# Patient Record
Sex: FEMALE | Race: WHITE | NOT HISPANIC OR LATINO | URBAN - METROPOLITAN AREA
[De-identification: names, ages, dates, MRNs, and addresses within clinical notes are randomized per-mention and may not be internally consistent; named-entity substitution may affect disease eponyms.]

---

## 2017-01-29 ENCOUNTER — OUTPATIENT (OUTPATIENT)
Dept: OUTPATIENT SERVICES | Facility: HOSPITAL | Age: 47
LOS: 1 days | Discharge: HOME | End: 2017-01-29

## 2017-06-27 DIAGNOSIS — R42 DIZZINESS AND GIDDINESS: ICD-10-CM

## 2018-02-11 ENCOUNTER — OUTPATIENT (OUTPATIENT)
Dept: OUTPATIENT SERVICES | Facility: HOSPITAL | Age: 48
LOS: 1 days | Discharge: HOME | End: 2018-02-11

## 2018-02-11 DIAGNOSIS — R22.0 LOCALIZED SWELLING, MASS AND LUMP, HEAD: ICD-10-CM

## 2018-08-16 ENCOUNTER — OUTPATIENT (OUTPATIENT)
Dept: OUTPATIENT SERVICES | Facility: HOSPITAL | Age: 48
LOS: 1 days | Discharge: HOME | End: 2018-08-16

## 2018-08-16 DIAGNOSIS — R05 COUGH: ICD-10-CM

## 2019-01-24 ENCOUNTER — OUTPATIENT (OUTPATIENT)
Dept: OUTPATIENT SERVICES | Facility: HOSPITAL | Age: 49
LOS: 1 days | Discharge: HOME | End: 2019-01-24

## 2019-01-24 DIAGNOSIS — R10.10 UPPER ABDOMINAL PAIN, UNSPECIFIED: ICD-10-CM

## 2019-01-24 DIAGNOSIS — N20.0 CALCULUS OF KIDNEY: ICD-10-CM

## 2019-06-01 PROBLEM — Z00.00 ENCOUNTER FOR PREVENTIVE HEALTH EXAMINATION: Status: ACTIVE | Noted: 2019-06-01

## 2019-06-04 ENCOUNTER — RECORD ABSTRACTING (OUTPATIENT)
Age: 49
End: 2019-06-04

## 2019-06-04 DIAGNOSIS — Z92.3 PERSONAL HISTORY OF IRRADIATION: ICD-10-CM

## 2019-06-04 DIAGNOSIS — G93.89 OTHER SPECIFIED DISORDERS OF BRAIN: ICD-10-CM

## 2019-06-04 DIAGNOSIS — Z83.3 FAMILY HISTORY OF DIABETES MELLITUS: ICD-10-CM

## 2019-06-04 DIAGNOSIS — Z80.9 FAMILY HISTORY OF MALIGNANT NEOPLASM, UNSPECIFIED: ICD-10-CM

## 2019-06-04 DIAGNOSIS — Z87.891 PERSONAL HISTORY OF NICOTINE DEPENDENCE: ICD-10-CM

## 2019-06-04 DIAGNOSIS — D32.9 BENIGN NEOPLASM OF MENINGES, UNSPECIFIED: ICD-10-CM

## 2019-06-04 DIAGNOSIS — H91.91 UNSPECIFIED HEARING LOSS, RIGHT EAR: ICD-10-CM

## 2019-06-04 DIAGNOSIS — Z86.018 PERSONAL HISTORY OF OTHER BENIGN NEOPLASM: ICD-10-CM

## 2019-06-04 DIAGNOSIS — G51.0 BELL'S PALSY: ICD-10-CM

## 2019-07-01 ENCOUNTER — APPOINTMENT (OUTPATIENT)
Dept: NEUROLOGY | Facility: CLINIC | Age: 49
End: 2019-07-01
Payer: COMMERCIAL

## 2019-07-01 VITALS
HEART RATE: 75 BPM | WEIGHT: 192 LBS | DIASTOLIC BLOOD PRESSURE: 85 MMHG | SYSTOLIC BLOOD PRESSURE: 130 MMHG | HEIGHT: 67 IN | BODY MASS INDEX: 30.13 KG/M2

## 2019-07-01 PROCEDURE — 99214 OFFICE O/P EST MOD 30 MIN: CPT

## 2019-07-01 RX ORDER — PHENAZOPYRIDINE HYDROCHLORIDE 200 MG/1
200 TABLET ORAL
Qty: 6 | Refills: 0 | Status: DISCONTINUED | COMMUNITY
Start: 2019-01-18 | End: 2019-07-01

## 2019-07-01 RX ORDER — NITROFURANTOIN (MONOHYDRATE/MACROCRYSTALS) 25; 75 MG/1; MG/1
100 CAPSULE ORAL
Qty: 14 | Refills: 0 | Status: DISCONTINUED | COMMUNITY
Start: 2019-01-18 | End: 2019-07-01

## 2019-07-01 NOTE — DISCUSSION/SUMMARY
[FreeTextEntry1] : Ms. 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  We will repeat her MRI if she has any new complaints.  We will have her follow-up here in one year.  She was told to call if any issues occur and will give her trial of fiorecet for migraine .

## 2019-07-01 NOTE — PHYSICAL EXAM
[FreeTextEntry1] : A+Ox3 language and attention normal\par Right LMN facial, Right tongue atrophy and deviation\par Remaining cranial nerves are normal\par Temp, Vib, PP symmetric throughout\par FTN NL\par Gait normal\par DTR 2+ in UE and 3+ in LE

## 2019-07-01 NOTE — HISTORY OF PRESENT ILLNESS
[FreeTextEntry1] : Ms. Vo is a 49-year-old woman last seen by me on 3/9/2018 for follow-up of schwannoma from the right CP angle, which was resected back in 1998 with subsequent right facial palsy.  She also had a transposition attempted from hypoglossal nerve to the right face, which failed.  She gets about 2-4 migraines a month.  She takes naproxen 500 mg when she gets migraines, occasionally she may require two.  Naproxen and Advil used to work but currently not helping.  She has tried imitrex and treximet before with no benefit.  Migraines are classic with photo and phonophobia and nausea with visual auras.  Usually migraines are associated with migraiting pain from one temple to the other.  She has had no new issues.  Her latest MRI, which was done on February 11, 2018 shows no change in size of the remaining meningioma as well as similar areas of encephalomalacia in the right brainstem and cerebellum.

## 2019-07-18 ENCOUNTER — RX CHANGE (OUTPATIENT)
Age: 49
End: 2019-07-18

## 2020-01-31 ENCOUNTER — APPOINTMENT (OUTPATIENT)
Dept: OBGYN | Facility: CLINIC | Age: 50
End: 2020-01-31
Payer: COMMERCIAL

## 2020-01-31 VITALS
BODY MASS INDEX: 29.82 KG/M2 | DIASTOLIC BLOOD PRESSURE: 82 MMHG | SYSTOLIC BLOOD PRESSURE: 124 MMHG | HEIGHT: 67 IN | WEIGHT: 190 LBS

## 2020-01-31 DIAGNOSIS — Z01.419 ENCOUNTER FOR GYNECOLOGICAL EXAMINATION (GENERAL) (ROUTINE) W/OUT ABNORMAL FINDINGS: ICD-10-CM

## 2020-01-31 DIAGNOSIS — D25.2 SUBSEROSAL LEIOMYOMA OF UTERUS: ICD-10-CM

## 2020-01-31 DIAGNOSIS — R23.2 FLUSHING: ICD-10-CM

## 2020-01-31 PROCEDURE — 99396 PREV VISIT EST AGE 40-64: CPT

## 2020-01-31 NOTE — PHYSICAL EXAM
[Alert] : alert [Awake] : awake [Acute Distress] : no acute distress [Mass] : no breast mass [Nipple Discharge] : no nipple discharge [Soft] : soft [Axillary LAD] : no axillary lymphadenopathy [Oriented x3] : oriented to person, place, and time [Tender] : non tender [Normal] : uterus [No Bleeding] : there was no active vaginal bleeding [Retroversion] : retroverted [Uterine Adnexae] : were not tender and not enlarged

## 2020-06-23 ENCOUNTER — APPOINTMENT (OUTPATIENT)
Dept: NEUROLOGY | Facility: CLINIC | Age: 50
End: 2020-06-23
Payer: COMMERCIAL

## 2020-06-23 DIAGNOSIS — G43.909 MIGRAINE, UNSPECIFIED, NOT INTRACTABLE, W/OUT STATUS MIGRAINOSUS: ICD-10-CM

## 2020-06-23 PROCEDURE — 99213 OFFICE O/P EST LOW 20 MIN: CPT | Mod: 95

## 2020-06-23 RX ORDER — GALCANEZUMAB 120 MG/ML
INJECTION, SOLUTION SUBCUTANEOUS
Refills: 0 | Status: ACTIVE | COMMUNITY

## 2020-06-23 NOTE — DISCUSSION/SUMMARY
[FreeTextEntry1] : Ms. Vo is a 51yo woman with migraines currently well controlled on EMGALATY.  Her prior meningioma is likely stable given no new complaints\par 1. MRI brain w/w/o FABIO in Feb 2021\par 2. Continue EMGALATY and if no longer covered then can try AIMOVIG or AJVOY\par 3. f/u in 1 year

## 2020-06-23 NOTE — HISTORY OF PRESENT ILLNESS
[FreeTextEntry1] : Patient was seen using 2-way audio/video telehealth platform and she was located at home at [address] [place] [address] and I was located at home.  Consent was obtained prior to the visit.\par \par Ms. Vo is a 49yo woman with history of schwannoma from the right CP angle, which was resected back in 1998 with subsequent right facial palsy. She also had a transposition attempted from hypoglossal nerve to the right face, which failed. She was getting about 4-5 migraines per month and after seeing pain management she was started on EMGALATY and has now been migraine free for the last 10 months.  She has no new medical issues and otherwise has been working from home and health.  No change inweight.\par No new family history\par Last MRI 2/2018 and plan was to repeat in 3 years unless new neurological issue arose

## 2020-06-23 NOTE — PHYSICAL EXAM
[FreeTextEntry1] : Right facial\par LMN\par A+Ox3 language and attention normal\par No drift and BRANDEN symmetric\par

## 2021-07-29 ENCOUNTER — APPOINTMENT (OUTPATIENT)
Dept: PLASTIC SURGERY | Facility: CLINIC | Age: 51
End: 2021-07-29
Payer: COMMERCIAL

## 2021-07-29 VITALS — BODY MASS INDEX: 29.03 KG/M2 | HEIGHT: 67 IN | WEIGHT: 185 LBS

## 2021-07-29 DIAGNOSIS — D48.5 NEOPLASM OF UNCERTAIN BEHAVIOR OF SKIN: ICD-10-CM

## 2021-07-29 PROCEDURE — 99203 OFFICE O/P NEW LOW 30 MIN: CPT

## 2021-07-29 PROCEDURE — 99072 ADDL SUPL MATRL&STAF TM PHE: CPT

## 2021-07-29 NOTE — PHYSICAL EXAM
[de-identified] : well developed female, NAD [de-identified] : NC/AT [de-identified] : PERRL [de-identified] : supple [de-identified] : unlabored breathing  [de-identified] : CHRISTINER [de-identified] : soft, nontender  [de-identified] : Right upper medial arm with lightly pigmented lesion, nontender, 3mm in diameter\par Right lateral prepatellar cyst, sot, mobile, nontender, 5mm in diameter, no overlying skin changes

## 2021-07-29 NOTE — HISTORY OF PRESENT ILLNESS
[FreeTextEntry1] : 52 yo F with PMHx of acoustic neuroma s/p excision with adjuvant RT (Sebastian in 1998) who presents today for evaluation of two skin lesions - right upper medial arm and right lateral knee, present for several months. Denies any pain, spontaneous bleeding or abnormal growth characteristics. Family hx positive for BCC - maternal GF. \par \par \par Occupation - compliant officer for [employer] \par Former smoker, quit in 1998

## 2021-07-29 NOTE — ASSESSMENT
[FreeTextEntry1] : 52 yo F with right upper arm and right prepatellar lesions indicated for excision,. \par \par - office procedure \par \par as above\par \par Regarding the procedure, we discussed scarring, poor wound healing, bleeding, infection, need for additional surgery, and dissatisfaction with the outcome.  Also discussed possibility of keloid and/or hypertrophic scar formation as well as recurrence.  All questions were answered and risks understood.\par \par non urgent--likely smal cysts

## 2021-12-10 ENCOUNTER — APPOINTMENT (OUTPATIENT)
Dept: PLASTIC SURGERY | Facility: CLINIC | Age: 51
End: 2021-12-10
Payer: COMMERCIAL

## 2021-12-10 PROCEDURE — 13121 CMPLX RPR S/A/L 2.6-7.5 CM: CPT | Mod: 59

## 2021-12-10 PROCEDURE — 11401 EXC TR-EXT B9+MARG 0.6-1 CM: CPT

## 2021-12-10 NOTE — PROCEDURE
[FreeTextEntry6] : Patient is a 51 year old female with a right upper arm lesion measuring approximately 1 x 0.5 cm and a right prepatellar  lesion measuring approximately 1 x 0.8 cm.\par \par The areas were prepped and draped in the usual fashion.  Local anesthetic was administered to each site using 1% lidocaine with epinephrine.\par \par Lesions were sharply excised. Both areas were irrigated copiously.  Complex wound closure was performed in layers at each site.  The right upper arm wound measured approximately 1.5 cm and the right prepatellar wound measured approximately 2 cm.\par \par Sterile dressing applied to each site.  \par \par Patient tolerated procedure well and understands post-op instructions.\par \par Sutures Used: 3-0 nylon\par \par \par Due to COVID 19, pre-visit patient instructions were explained to the patient and their symptoms were checked upon arrival.  \par Masks were used by the health care providers and staff and the examination room was cleaned after the patient visit was completed.\par

## 2021-12-14 ENCOUNTER — APPOINTMENT (OUTPATIENT)
Dept: OBGYN | Facility: CLINIC | Age: 51
End: 2021-12-14
Payer: COMMERCIAL

## 2021-12-14 VITALS — WEIGHT: 192 LBS | DIASTOLIC BLOOD PRESSURE: 82 MMHG | BODY MASS INDEX: 30.07 KG/M2 | SYSTOLIC BLOOD PRESSURE: 118 MMHG

## 2021-12-14 DIAGNOSIS — Z01.419 ENCOUNTER FOR GYNECOLOGICAL EXAMINATION (GENERAL) (ROUTINE) W/OUT ABNORMAL FINDINGS: ICD-10-CM

## 2021-12-14 PROCEDURE — 99396 PREV VISIT EST AGE 40-64: CPT

## 2021-12-14 NOTE — DISCUSSION/SUMMARY
[FreeTextEntry1] : Pap done\par Self breast exam stressed\par Prescribed bilateral screening mammogram\par Prescribed pelvic ultrasound\par Prescribed hormone profile\par Issues regarding postmenopausal bleeding discussed with patient including the importance of timely diagnosis and treatment.\par Follow-up 2 weeks

## 2021-12-14 NOTE — HISTORY OF PRESENT ILLNESS
[FreeTextEntry1] : Patient is 51 years old para 1-0-0-1 last menstrual period November 2021, prior menstrual period February 2019.\par Patient denies pain.  She has a history of a subserosal uterine fibroid.

## 2021-12-22 ENCOUNTER — APPOINTMENT (OUTPATIENT)
Dept: PLASTIC SURGERY | Facility: CLINIC | Age: 51
End: 2021-12-22
Payer: COMMERCIAL

## 2021-12-22 LAB — CORE LAB BIOPSY: NORMAL

## 2021-12-22 PROCEDURE — 99212 OFFICE O/P EST SF 10 MIN: CPT

## 2021-12-22 NOTE — HISTORY OF PRESENT ILLNESS
[FreeTextEntry1] : 50 yo F with PMHx of acoustic neuroma s/p excision with adjuvant RT (Sebastian in 1998) who presents today for evaluation of two skin lesions - right upper medial arm and right lateral knee, present for several months. Denies any pain, spontaneous bleeding or abnormal growth characteristics. Family hx positive for BCC - maternal GF. 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 Feeling well and offers no complaints.

## 2021-12-22 NOTE — ASSESSMENT
[FreeTextEntry1] : 50 yo F with right upper arm and right prepatellar lesions, now POD #12 s/p excision,, doing well\par \par -Pathology reviewed: benign\par -Sutures removed\par -Steri strip applied\par -Wound care and scar creams reviewed\par -Sun protection and routine dermatology f/u\par -F/u PRN\par \par Due to COVID-19, pre-visit patient instructions were explained to the patient and their symptoms were checked upon arrival. Masks were used by the healthcare provider and staff and the examination room was cleaned after the patient visit concluded\par \par

## 2021-12-22 NOTE — PHYSICAL EXAM
[de-identified] : well developed female, NAD [de-identified] : NC/AT [de-identified] : PERRL [de-identified] : supple [de-identified] : unlabored breathing  [de-identified] : CHRISTINER [de-identified] : soft, nontender  [de-identified] : Right upper medial arm and right lateral anterior knee incisions healing well, sutures C/D/I, incisional tenderness as expected, no significant erythema/swelling\par

## 2023-03-13 ENCOUNTER — APPOINTMENT (OUTPATIENT)
Dept: OBGYN | Facility: CLINIC | Age: 53
End: 2023-03-13
Payer: COMMERCIAL

## 2023-03-13 VITALS — WEIGHT: 196 LBS | DIASTOLIC BLOOD PRESSURE: 80 MMHG | SYSTOLIC BLOOD PRESSURE: 118 MMHG | BODY MASS INDEX: 30.7 KG/M2

## 2023-03-13 DIAGNOSIS — Z01.419 ENCOUNTER FOR GYNECOLOGICAL EXAMINATION (GENERAL) (ROUTINE) W/OUT ABNORMAL FINDINGS: ICD-10-CM

## 2023-03-13 PROCEDURE — 99396 PREV VISIT EST AGE 40-64: CPT

## 2023-03-13 NOTE — HISTORY OF PRESENT ILLNESS
[FreeTextEntry1] : Patient is 53 years old para 1-0-0-1 last menstrual period November 2021\par She denies postmenopausal bleeding and is presently without complaints

## 2023-03-13 NOTE — DISCUSSION/SUMMARY
[FreeTextEntry1] : Pap done\par Self breast exam stressed\par Prescribed bilateral screening mammogram\par Prescribed pelvic ultrasound\par Follow-up yearly or as needed

## 2023-03-28 ENCOUNTER — TRANSCRIPTION ENCOUNTER (OUTPATIENT)
Age: 53
End: 2023-03-28

## 2023-03-29 ENCOUNTER — NON-APPOINTMENT (OUTPATIENT)
Age: 53
End: 2023-03-29

## 2023-03-29 ENCOUNTER — APPOINTMENT (OUTPATIENT)
Dept: RHEUMATOLOGY | Facility: CLINIC | Age: 53
End: 2023-03-29
Payer: COMMERCIAL

## 2023-03-29 VITALS
SYSTOLIC BLOOD PRESSURE: 128 MMHG | HEIGHT: 67 IN | OXYGEN SATURATION: 98 % | WEIGHT: 195 LBS | BODY MASS INDEX: 30.61 KG/M2 | HEART RATE: 86 BPM | DIASTOLIC BLOOD PRESSURE: 86 MMHG

## 2023-03-29 PROCEDURE — 99204 OFFICE O/P NEW MOD 45 MIN: CPT

## 2023-03-29 RX ORDER — MULTIVITAMIN
TABLET ORAL
Refills: 0 | Status: ACTIVE | COMMUNITY

## 2023-03-29 RX ORDER — OMEGA-3/DHA/EPA/FISH OIL 300-1000MG
1000 CAPSULE ORAL
Refills: 0 | Status: ACTIVE | COMMUNITY

## 2023-03-29 NOTE — PHYSICAL EXAM
[General Appearance - Alert] : alert [General Appearance - In No Acute Distress] : in no acute distress [Sclera] : the sclera and conjunctiva were normal [PERRL With Normal Accommodation] : pupils were equal in size, round, and reactive to light [Extraocular Movements] : extraocular movements were intact [Outer Ear] : the ears and nose were normal in appearance [Oropharynx] : the oropharynx was normal [Neck Appearance] : the appearance of the neck was normal [Neck Cervical Mass (___cm)] : no neck mass was observed [Jugular Venous Distention Increased] : there was no jugular-venous distention [Thyroid Diffuse Enlargement] : the thyroid was not enlarged [Thyroid Nodule] : there were no palpable thyroid nodules [Auscultation Breath Sounds / Voice Sounds] : lungs were clear to auscultation bilaterally [Heart Rate And Rhythm] : heart rate was normal and rhythm regular [Heart Sounds] : normal S1 and S2 [Heart Sounds Gallop] : no gallops [Murmurs] : no murmurs [Heart Sounds Pericardial Friction Rub] : no pericardial rub [Bowel Sounds] : normal bowel sounds [Abdomen Soft] : soft [Abdomen Tenderness] : non-tender [Abdomen Mass (___ Cm)] : no abdominal mass palpated [Abnormal Walk] : normal gait [Musculoskeletal - Swelling] : no joint swelling seen [Nail Clubbing] : no clubbing  or cyanosis of the fingernails [Motor Tone] : muscle strength and tone were normal [] : no rash [Affect] : the affect was normal [Mood] : the mood was normal [FreeTextEntry1] : Tenderness with range of motion of bilateral shoulders L > R. Tenderness is lateral superior to triceps. Right hand 2nd MCP and thumb are swollen, non tender

## 2023-03-29 NOTE — ASSESSMENT
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 Her ESR is elevated to 70 which raises concern for early ANCA vasculitis although she has no other symptoms of this condition. Polymyalgia rheumatica is also on the differential with neck stiffness and shoulder pains, although we typically see this condition in patients that are older. MORIS was negative low suspicion for SLE. Viral arthritis is possible as well but she isn't having fevers or symptoms of a viral infection. \par -Check CCP, ANCA, dsDNA, Zamora, RNP, SSA, SSB, CPK, Aldolase, HLA B27, Alona-1,  Scl-70 antibody, right hand x-ray\par -Prednisone 15 mg daily

## 2023-03-29 NOTE — HISTORY OF PRESENT ILLNESS
[FreeTextEntry1] : Patient reports 2 weeks ago she developed a stiff neck her range of motion was limited and couldn't move her neck side to side. This lasted for 2 days and resolved, and now feels her muscles are tight and has some pains in the back of the right side of her neck. States her neck muscles are tight from her history of acoustic neuroma surgery x 2, stereotactic radiation. Then she developed shoulder pain switching between one shoulder, her right, and then both, hurt her when she tried to move her shoulders. It was hard to drive picking her arms up to put them on the steering wheel. Pain woke her up from sleep and couldn't move her arms or lift her arms especially her left shoulder. This lasted for 1 day and then migrated to her right hand pain, swelling, tightness and weakness. These symptoms are still present. Labs by PCP apparently showed inflammation. Tumeric helped. She took motrin but it didn't help. She took prednisone 20 mg x 5 days and then 10 mg x 5. 10 mg didn't help as much, but she was able to move her arms. In the middle of the night she was unable to lift her arms up, but after taking prednisone she was able. Her hands also felt better on steroids. She also reports that her arms tense up when holding onto a shopping cart at the grocery store due to feeling anxious from so many people around. \par \par +Rash on her face in november went to urgent care and got topical therapy that made her skin worse x 2. PO antibiotics by her PCP helped. \par +Fatigue\par \par Denies fevers, weight loss, night sweats, photosensitivity, raynaud's, dysphagia, skin thickening, oral ulcers, nasal ulcers, hair loss, sinus problems, nosebleeds, visual disturbances, +dry eyes uses, +dry mouth, history of VTE, history of miscarriage, history of serositis   \par \par labs by pcp: Negative Gala, RF, Lyme, CBC, EBV. Crp 13, Esr 70.  \par \par

## 2023-04-08 ENCOUNTER — OUTPATIENT (OUTPATIENT)
Dept: OUTPATIENT SERVICES | Facility: HOSPITAL | Age: 53
LOS: 1 days | End: 2023-04-08
Payer: COMMERCIAL

## 2023-04-08 DIAGNOSIS — M79.641 PAIN IN RIGHT HAND: ICD-10-CM

## 2023-04-08 PROCEDURE — 73130 X-RAY EXAM OF HAND: CPT | Mod: 26,RT

## 2023-04-08 PROCEDURE — 73130 X-RAY EXAM OF HAND: CPT | Mod: RT

## 2023-04-09 DIAGNOSIS — M79.641 PAIN IN RIGHT HAND: ICD-10-CM

## 2023-04-20 ENCOUNTER — APPOINTMENT (OUTPATIENT)
Dept: RHEUMATOLOGY | Facility: CLINIC | Age: 53
End: 2023-04-20
Payer: COMMERCIAL

## 2023-04-20 VITALS
BODY MASS INDEX: 29.03 KG/M2 | HEIGHT: 67 IN | DIASTOLIC BLOOD PRESSURE: 76 MMHG | HEART RATE: 70 BPM | SYSTOLIC BLOOD PRESSURE: 118 MMHG | OXYGEN SATURATION: 98 % | WEIGHT: 185 LBS

## 2023-04-20 DIAGNOSIS — M25.50 PAIN IN UNSPECIFIED JOINT: ICD-10-CM

## 2023-04-20 PROCEDURE — 99214 OFFICE O/P EST MOD 30 MIN: CPT

## 2023-04-20 RX ORDER — ERGOCALCIFEROL 1.25 MG/1
1.25 MG CAPSULE, LIQUID FILLED ORAL
Refills: 0 | Status: ACTIVE | COMMUNITY

## 2023-04-20 RX ORDER — TURMERIC ROOT EXTRACT 500 MG
TABLET ORAL
Refills: 0 | Status: ACTIVE | COMMUNITY

## 2023-04-20 RX ORDER — BLUE-GREEN ALGAE 500 MG
CAPSULE ORAL
Refills: 0 | Status: ACTIVE | COMMUNITY

## 2023-04-20 NOTE — HISTORY OF PRESENT ILLNESS
[FreeTextEntry1] : 4/20/23:\par Reports prednisone 15 mg is helping her joint pains and allows her to sleep at night. Reports hands are sore when she wakes up in the morning but by mid morning she feels better. Its hard to  objects due to her thumb in the mornings. Hands look swollen to her. Denies fevers, skin rash, respiratory complaints, urinary complaints, sinus complaints. \par \par \par \par \par Initial visit:\par Patient reports 2 weeks ago she developed a stiff neck her range of motion was limited and couldn't move her neck side to side. This lasted for 2 days and resolved, and now feels her muscles are tight and has some pains in the back of the right side of her neck. States her neck muscles are tight from her history of acoustic neuroma surgery x 2, stereotactic radiation. Then she developed shoulder pain switching between one shoulder, her right, and then both, hurt her when she tried to move her shoulders. It was hard to drive picking her arms up to put them on the steering wheel. Pain woke her up from sleep and couldn't move her arms or lift her arms especially her left shoulder. This lasted for 1 day and then migrated to her right hand pain, swelling, tightness and weakness. These symptoms are still present. Labs by PCP apparently showed inflammation. Tumeric helped. She took motrin but it didn't help. She took prednisone 20 mg x 5 days and then 10 mg x 5. 10 mg didn't help as much, but she was able to move her arms. In the middle of the night she was unable to lift her arms up, but after taking prednisone she was able. Her hands also felt better on steroids. She also reports that her arms tense up when holding onto a shopping cart at the grocery store due to feeling anxious from so many people around. \par \par +Rash on her face in november went to urgent care and got topical therapy that made her skin worse x 2. PO antibiotics by her PCP helped. \par +Fatigue\par \par Denies fevers, weight loss, night sweats, photosensitivity, raynaud's, dysphagia, skin thickening, oral ulcers, nasal ulcers, hair loss, sinus problems, nosebleeds, visual disturbances, +dry eyes uses, +dry mouth, history of VTE, history of miscarriage, history of serositis   \par \par labs by pcp: Negative Gala, RF, Lyme, CBC, EBV. Crp 13, Esr 70.  \par \par

## 2023-04-20 NOTE — ASSESSMENT
[FreeTextEntry1] : Migrating arthralgias\par Rheumatoid arthritis CCP >250\par C-ANCA 1:320\par -Symptoms improved on steroids\par -No clinical signs or symptoms of ANCA associated vasculitis. Continue to monitor for this\par -Start methotrexate 15 mg weekly, folic acid 1 mg daily\par -Continue prednisone 15 mg daily and taper as able\par -Check hepatitis B and C, TB quantiferon\par -Labs reviewed ALT 34 in February\par -Repeat CBC, CMP, ESR, CRP in 1 month

## 2023-04-21 ENCOUNTER — RX RENEWAL (OUTPATIENT)
Age: 53
End: 2023-04-21

## 2023-05-08 ENCOUNTER — APPOINTMENT (OUTPATIENT)
Dept: NEUROLOGY | Facility: CLINIC | Age: 53
End: 2023-05-08

## 2023-05-17 ENCOUNTER — APPOINTMENT (OUTPATIENT)
Dept: RHEUMATOLOGY | Facility: CLINIC | Age: 53
End: 2023-05-17
Payer: COMMERCIAL

## 2023-05-17 VITALS
DIASTOLIC BLOOD PRESSURE: 80 MMHG | OXYGEN SATURATION: 98 % | SYSTOLIC BLOOD PRESSURE: 120 MMHG | BODY MASS INDEX: 29.03 KG/M2 | WEIGHT: 185 LBS | HEART RATE: 73 BPM | HEIGHT: 67 IN

## 2023-05-17 PROCEDURE — 99214 OFFICE O/P EST MOD 30 MIN: CPT

## 2023-05-17 RX ORDER — PREDNISONE 5 MG/1
5 TABLET ORAL
Qty: 90 | Refills: 0 | Status: ACTIVE | COMMUNITY
Start: 2023-03-29 | End: 1900-01-01

## 2023-05-17 NOTE — ASSESSMENT
[FreeTextEntry1] : Migrating arthralgias\par Rheumatoid arthritis CCP >250\par C-ANCA 1:320\par -Symptoms improved on steroids and methotrexate\par -No clinical signs or symptoms of ANCA associated vasculitis. Continue to monitor for this\par -Continue methotrexate 15 mg weekly, folic acid 1 mg daily\par -Continue prednisone 10 mg x 2 weeks and then taper to 7.5 mg x 2 weeks,  5 mg x 2 weeks\par -Repeat CBC, CMP, ESR, CRP in 1 month

## 2023-05-17 NOTE — HISTORY OF PRESENT ILLNESS
[FreeTextEntry1] : 5/17/23:\par She reports no pain in her joints they have stopped. When she forgets to take prednisone 5 mg evening dose she feels sore and stiff. Denies morning stiffness or joint swelling. After her first dose of methotrexate she developed bumps on her bilateral 2nd DIPs. She is getting her 2nd shingrix dose upcoming has not scheduled yet. \par \par \par 4/20/23:\par Reports prednisone 15 mg is helping her joint pains and allows her to sleep at night. Reports hands are sore when she wakes up in the morning but by mid morning she feels better. Its hard to  objects due to her thumb in the mornings. Hands look swollen to her. Denies fevers, skin rash, respiratory complaints, urinary complaints, sinus complaints. \par \par \par \par \par Initial visit:\par Patient reports 2 weeks ago she developed a stiff neck her range of motion was limited and couldn't move her neck side to side. This lasted for 2 days and resolved, and now feels her muscles are tight and has some pains in the back of the right side of her neck. States her neck muscles are tight from her history of acoustic neuroma surgery x 2, stereotactic radiation. Then she developed shoulder pain switching between one shoulder, her right, and then both, hurt her when she tried to move her shoulders. It was hard to drive picking her arms up to put them on the steering wheel. Pain woke her up from sleep and couldn't move her arms or lift her arms especially her left shoulder. This lasted for 1 day and then migrated to her right hand pain, swelling, tightness and weakness. These symptoms are still present. Labs by PCP apparently showed inflammation. Tumeric helped. She took motrin but it didn't help. She took prednisone 20 mg x 5 days and then 10 mg x 5. 10 mg didn't help as much, but she was able to move her arms. In the middle of the night she was unable to lift her arms up, but after taking prednisone she was able. Her hands also felt better on steroids. She also reports that her arms tense up when holding onto a shopping cart at the grocery store due to feeling anxious from so many people around. \par \par +Rash on her face in november went to urgent care and got topical therapy that made her skin worse x 2. PO antibiotics by her PCP helped. \par +Fatigue\par \par Denies fevers, weight loss, night sweats, photosensitivity, raynaud's, dysphagia, skin thickening, oral ulcers, nasal ulcers, hair loss, sinus problems, nosebleeds, visual disturbances, +dry eyes uses, +dry mouth, history of VTE, history of miscarriage, history of serositis   \par \par labs by pcp: Negative Gala, RF, Lyme, CBC, EBV. Crp 13, Esr 70.  \par \par

## 2023-06-04 ENCOUNTER — OUTPATIENT (OUTPATIENT)
Dept: OUTPATIENT SERVICES | Facility: HOSPITAL | Age: 53
LOS: 1 days | End: 2023-06-04
Payer: COMMERCIAL

## 2023-06-04 DIAGNOSIS — Z00.8 ENCOUNTER FOR OTHER GENERAL EXAMINATION: ICD-10-CM

## 2023-06-04 DIAGNOSIS — D32.9 BENIGN NEOPLASM OF MENINGES, UNSPECIFIED: ICD-10-CM

## 2023-06-04 PROCEDURE — 70553 MRI BRAIN STEM W/O & W/DYE: CPT

## 2023-06-04 PROCEDURE — A9579: CPT

## 2023-06-04 PROCEDURE — 70553 MRI BRAIN STEM W/O & W/DYE: CPT | Mod: 26

## 2023-06-05 ENCOUNTER — NON-APPOINTMENT (OUTPATIENT)
Age: 53
End: 2023-06-05

## 2023-06-05 DIAGNOSIS — D32.9 BENIGN NEOPLASM OF MENINGES, UNSPECIFIED: ICD-10-CM

## 2023-06-13 ENCOUNTER — NON-APPOINTMENT (OUTPATIENT)
Age: 53
End: 2023-06-13

## 2023-06-30 ENCOUNTER — RX RENEWAL (OUTPATIENT)
Age: 53
End: 2023-06-30

## 2023-07-26 ENCOUNTER — APPOINTMENT (OUTPATIENT)
Dept: RHEUMATOLOGY | Facility: CLINIC | Age: 53
End: 2023-07-26
Payer: COMMERCIAL

## 2023-07-26 VITALS
HEART RATE: 69 BPM | OXYGEN SATURATION: 97 % | DIASTOLIC BLOOD PRESSURE: 80 MMHG | SYSTOLIC BLOOD PRESSURE: 118 MMHG | BODY MASS INDEX: 28.56 KG/M2 | HEIGHT: 67 IN | WEIGHT: 182 LBS

## 2023-07-26 PROCEDURE — 99214 OFFICE O/P EST MOD 30 MIN: CPT

## 2023-07-26 RX ORDER — FOLIC ACID 1 MG/1
1 TABLET ORAL
Qty: 90 | Refills: 3 | Status: ACTIVE | COMMUNITY
Start: 2023-04-20 | End: 1900-01-01

## 2023-07-26 RX ORDER — METHOTREXATE 2.5 MG/1
2.5 TABLET ORAL
Qty: 72 | Refills: 1 | Status: ACTIVE | COMMUNITY
Start: 2023-04-20 | End: 1900-01-01

## 2023-07-26 NOTE — ASSESSMENT
[FreeTextEntry1] : Migrating arthralgias\par Rheumatoid arthritis CCP >250\par C-ANCA 1:320\par -Symptoms improved on steroids and methotrexate\par -No clinical signs or symptoms of ANCA associated vasculitis. Continue to monitor for this\par -Continue methotrexate 15 mg weekly, folic acid 1 mg daily\par -Prednisone 5 mg as needed she will attempt to discontinue\par -Check CBC, CMP, ESR, CRP, repeat ANCA

## 2023-07-26 NOTE — QUALITY MEASURES
[Offered a DMARD for rheumatoid] : patient offered a DMARD for rheumatoid arthritis [Screened for TB within the past 12] : patient screened for TB within the past 12 months

## 2023-07-26 NOTE — HISTORY OF PRESENT ILLNESS
[FreeTextEntry1] : 7/26/23:\par Pt is taking prednisone 5 mg daily\par Denies morning stiffness, joint pain or swelling\par She is going for 2nd shingrix vaccine tomorrow\par \par \par 5/17/23:\par She reports no pain in her joints they have stopped. When she forgets to take prednisone 5 mg evening dose she feels sore and stiff. Denies morning stiffness or joint swelling. After her first dose of methotrexate she developed bumps on her bilateral 2nd DIPs. She is getting her 2nd shingrix dose upcoming has not scheduled yet. \par \par \par 4/20/23:\par Reports prednisone 15 mg is helping her joint pains and allows her to sleep at night. Reports hands are sore when she wakes up in the morning but by mid morning she feels better. Its hard to  objects due to her thumb in the mornings. Hands look swollen to her. Denies fevers, skin rash, respiratory complaints, urinary complaints, sinus complaints. \par \par \par \par \par Initial visit:\par Patient reports 2 weeks ago she developed a stiff neck her range of motion was limited and couldn't move her neck side to side. This lasted for 2 days and resolved, and now feels her muscles are tight and has some pains in the back of the right side of her neck. States her neck muscles are tight from her history of acoustic neuroma surgery x 2, stereotactic radiation. Then she developed shoulder pain switching between one shoulder, her right, and then both, hurt her when she tried to move her shoulders. It was hard to drive picking her arms up to put them on the steering wheel. Pain woke her up from sleep and couldn't move her arms or lift her arms especially her left shoulder. This lasted for 1 day and then migrated to her right hand pain, swelling, tightness and weakness. These symptoms are still present. Labs by PCP apparently showed inflammation. Tumeric helped. She took motrin but it didn't help. She took prednisone 20 mg x 5 days and then 10 mg x 5. 10 mg didn't help as much, but she was able to move her arms. In the middle of the night she was unable to lift her arms up, but after taking prednisone she was able. Her hands also felt better on steroids. She also reports that her arms tense up when holding onto a shopping cart at the grocery store due to feeling anxious from so many people around. \par \par +Rash on her face in november went to urgent care and got topical therapy that made her skin worse x 2. PO antibiotics by her PCP helped. \par +Fatigue\par \par Denies fevers, weight loss, night sweats, photosensitivity, raynaud's, dysphagia, skin thickening, oral ulcers, nasal ulcers, hair loss, sinus problems, nosebleeds, visual disturbances, +dry eyes uses, +dry mouth, history of VTE, history of miscarriage, history of serositis   \par \par labs by pcp: Negative Gala, RF, Lyme, CBC, EBV. Crp 13, Esr 70.  \par \par

## 2023-09-06 ENCOUNTER — APPOINTMENT (OUTPATIENT)
Dept: RHEUMATOLOGY | Facility: CLINIC | Age: 53
End: 2023-09-06
Payer: COMMERCIAL

## 2023-09-06 VITALS
HEART RATE: 82 BPM | SYSTOLIC BLOOD PRESSURE: 122 MMHG | WEIGHT: 182 LBS | HEIGHT: 67 IN | BODY MASS INDEX: 28.56 KG/M2 | DIASTOLIC BLOOD PRESSURE: 91 MMHG

## 2023-09-06 DIAGNOSIS — L65.9 NONSCARRING HAIR LOSS, UNSPECIFIED: ICD-10-CM

## 2023-09-06 DIAGNOSIS — E55.9 VITAMIN D DEFICIENCY, UNSPECIFIED: ICD-10-CM

## 2023-09-06 DIAGNOSIS — R76.8 OTHER SPECIFIED ABNORMAL IMMUNOLOGICAL FINDINGS IN SERUM: ICD-10-CM

## 2023-09-06 PROCEDURE — 99215 OFFICE O/P EST HI 40 MIN: CPT

## 2023-09-06 NOTE — HISTORY OF PRESENT ILLNESS
[FreeTextEntry1] : Pt was diagnosed with RA in March 2023, when she presented with b/l shoulder pain and stiffness, and pain in her PIPs. She was started on methotrexate 15 mg with significant improvement of her symptoms. However, in August 2023 she received a shingles vaccine, and then she developed Covid, so she held the methotrexate for a month. When she restarted the methotrexate a few weeks ago, she noticed that her hair started to fall out, which she is not sure is due to the Covid or the methotrexate, and did not happen the last time she took methotrexate.   + Also severe R CMC pain. Pt tried using Voltaren gel with no improvement.  Physical exam: GEN: Pleasant, AAO woman sitting on exam table in NAD HEAD: + Mild alopecia along part MOUTH: Moist mucous membranes, no oral ulcers, normal oral aperture ENT: no LAD PULM: Clear to auscultation b/l CV: Regular rate and rhythm, no murmurs MSK: Shoulders: Full ROM b/l Elbows: Full ROM B/l Wrists: Full ROM b/l, + Mild Finkelstein on R Hands: + TTP in R CMC Hips: Full ROM b/l Knees: no effusions, full ROM b/l Ankles: no effusions, full ROM b/l Feet: no effusions, no TTP

## 2023-09-06 NOTE — ASSESSMENT
[FreeTextEntry1] : Rheumatoid arthritis: With CCP>250, with arthritis in pt's shoulders and PIPs, improved with methotrexate 15 mg. Pt is now having hair loss in the setting of holding methotrexate for Covid and then restarting it; unclear if this is an adverse effect from the methotrexate or telogen effluvium after illness. + R CMC pain which is likely due to OA; pt had hand x-ray in 4/2023 which demonstrated mild R CMC OA. - Increase folic acid to 2 mg q day - Continue methotrexate 15 mg q week for now. If pt continues to experience issues with hair loss, can consider switching to leflunomide - f/u CBC, CMP, ESR, CRP, also additional labs for hair shedding - Offered pt R CMC steroid injection but she declined - Advised pt to try lidocaine patch and thumb-based spica splint for her R thumb pain  f/u in 1 month

## 2023-10-01 PROBLEM — Z92.3 HISTORY OF RADIATION THERAPY: Status: RESOLVED | Noted: 2019-06-04 | Resolved: 2023-10-01

## 2023-10-25 ENCOUNTER — APPOINTMENT (OUTPATIENT)
Dept: RHEUMATOLOGY | Facility: CLINIC | Age: 53
End: 2023-10-25
Payer: COMMERCIAL

## 2023-10-25 VITALS
BODY MASS INDEX: 29.03 KG/M2 | WEIGHT: 185 LBS | HEART RATE: 92 BPM | HEIGHT: 67 IN | OXYGEN SATURATION: 96 % | SYSTOLIC BLOOD PRESSURE: 120 MMHG | TEMPERATURE: 98 F | DIASTOLIC BLOOD PRESSURE: 82 MMHG

## 2023-10-25 DIAGNOSIS — D72.819 DECREASED WHITE BLOOD CELL COUNT, UNSPECIFIED: ICD-10-CM

## 2023-10-25 DIAGNOSIS — M06.09 RHEUMATOID ARTHRITIS W/OUT RHEUMATOID FACTOR, MULTIPLE SITES: ICD-10-CM

## 2023-10-25 DIAGNOSIS — N39.0 URINARY TRACT INFECTION, SITE NOT SPECIFIED: ICD-10-CM

## 2023-10-25 PROCEDURE — 99214 OFFICE O/P EST MOD 30 MIN: CPT

## 2023-10-25 RX ORDER — METHOTREXATE 2.5 MG/1
2.5 TABLET ORAL
Qty: 73 | Refills: 1 | Status: ACTIVE | COMMUNITY
Start: 2023-10-25 | End: 1900-01-01

## 2024-01-24 ENCOUNTER — APPOINTMENT (OUTPATIENT)
Dept: RHEUMATOLOGY | Facility: CLINIC | Age: 54
End: 2024-01-24